# Patient Record
Sex: MALE | Race: BLACK OR AFRICAN AMERICAN | URBAN - METROPOLITAN AREA
[De-identification: names, ages, dates, MRNs, and addresses within clinical notes are randomized per-mention and may not be internally consistent; named-entity substitution may affect disease eponyms.]

---

## 2020-11-17 ENCOUNTER — OFFICE VISIT (OUTPATIENT)
Dept: URBAN - METROPOLITAN AREA CLINIC 27 | Facility: CLINIC | Age: 80
End: 2020-11-17
Payer: MEDICARE

## 2020-11-17 DIAGNOSIS — E11.9 TYPE 2 DIABETES MELLITUS WITHOUT COMPLICATIONS: ICD-10-CM

## 2020-11-17 PROCEDURE — 92134 CPTRZ OPH DX IMG PST SGM RTA: CPT | Performed by: OPHTHALMOLOGY

## 2020-11-17 PROCEDURE — 67028 INJECTION EYE DRUG: CPT | Performed by: OPHTHALMOLOGY

## 2020-11-17 ASSESSMENT — INTRAOCULAR PRESSURE
OD: 14
OS: 12

## 2020-11-17 NOTE — IMPRESSION/PLAN
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
s/p Avastin, last OD 9/17/2020 Plan: Exam and OCT continue to show extrafoveal SRF, stable s/p Avastin 9 weeks ago. I have recommended continuing treatment q10 weeks. The diagnosis, natural history, and prognosis of CNV, as well as the R/B/A of laser, PDT, and anti-VEGF were discussed. The patient understands that treatment may not improve vision but should reduce the risk of further visual loss. The patient elects to proceed with intravitreal Avastin OD, which was performed in the office per protocol without complication. 

RTC 10 weeks OCT OU re-eval Avastin, DFE OU (semiannual)

## 2020-11-17 NOTE — IMPRESSION/PLAN
Impression: Type 2 diabetes mellitus without complications: I92.8. OU. Plan: Fortunately exam continues to show no evidence of retinopathy. The patient was advised to maintain tight BS, BP, and lipid control. The patient was also advised to schedule an appointment with a PCP for a diabetic evaluation and counseling to avoid the systemic complications of diabetes.  Last A1C- 6.5

## 2021-01-26 ENCOUNTER — OFFICE VISIT (OUTPATIENT)
Dept: URBAN - METROPOLITAN AREA CLINIC 27 | Facility: CLINIC | Age: 81
End: 2021-01-26
Payer: MEDICARE

## 2021-01-26 PROCEDURE — 92134 CPTRZ OPH DX IMG PST SGM RTA: CPT | Performed by: OPHTHALMOLOGY

## 2021-01-26 PROCEDURE — 92014 COMPRE OPH EXAM EST PT 1/>: CPT | Performed by: OPHTHALMOLOGY

## 2021-01-26 PROCEDURE — 67028 INJECTION EYE DRUG: CPT | Performed by: OPHTHALMOLOGY

## 2021-01-26 ASSESSMENT — INTRAOCULAR PRESSURE
OD: 12
OS: 13

## 2021-01-26 NOTE — IMPRESSION/PLAN
Impression: Type 2 diabetes mellitus without complications: O73.0. OU. Plan: Fortunately exam continues to show no evidence of retinopathy. The patient was advised to maintain tight BS, BP, and lipid control. The patient was also advised to schedule an appointment with a PCP for a diabetic evaluation and counseling to avoid the systemic complications of diabetes.  Last A1C- 6.5

## 2021-01-26 NOTE — IMPRESSION/PLAN
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
s/p Avastin, last OD 11/17/2020 Plan: Both eyes are due for full dilated semiannual exam today. Exam and OCT continue to show extrafoveal SRF, improving s/p Avastin 10 weeks ago. I have recommended extending treatment interval q12 weeks. The diagnosis, natural history, and prognosis of CNV, as well as the R/B/A of laser, PDT, and anti-VEGF were discussed. The patient understands that treatment may not improve vision but should reduce the risk of further visual loss. The patient elects to proceed with intravitreal Avastin OD, which was performed in the office per protocol without complication. 

RTC 12 weeks OCT OU re-eval Avastin

## 2021-04-20 ENCOUNTER — OFFICE VISIT (OUTPATIENT)
Dept: URBAN - METROPOLITAN AREA CLINIC 27 | Facility: CLINIC | Age: 81
End: 2021-04-20
Payer: MEDICARE

## 2021-04-20 PROCEDURE — 99213 OFFICE O/P EST LOW 20 MIN: CPT | Performed by: OPHTHALMOLOGY

## 2021-04-20 PROCEDURE — 67028 INJECTION EYE DRUG: CPT | Performed by: OPHTHALMOLOGY

## 2021-04-20 PROCEDURE — 92134 CPTRZ OPH DX IMG PST SGM RTA: CPT | Performed by: OPHTHALMOLOGY

## 2021-04-20 ASSESSMENT — INTRAOCULAR PRESSURE
OS: 9
OD: 10

## 2021-04-20 NOTE — IMPRESSION/PLAN
Impression: Type 2 diabetes mellitus without complications: O77.3. OU. Plan: Fortunately exam continues to show no evidence of retinopathy. The patient was advised to maintain tight BS, BP, and lipid control. The patient was also advised to schedule an appointment with a PCP for a diabetic evaluation and counseling to avoid the systemic complications of diabetes.  Last A1C- 6.5

## 2021-04-20 NOTE — IMPRESSION/PLAN
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
s/p Avastin, last OD 1/26/21
last DFE OU 1/26/21 Plan: Exam and OCT demonstrate worsening extrafoveal SRF s/p Avastin 12 weeks ago. I have recommended increasing treatment frequency back to q10 weeks. The diagnosis, natural history, and prognosis of CNV, as well as the R/B/A of laser, PDT, and anti-VEGF were discussed. The patient understands that treatment may not improve vision but should reduce the risk of further visual loss. The patient elects to proceed with intravitreal Avastin OD, which was performed in the office per protocol without complication. 

RTC 10 weeks OCT OU re-eval Avastin

## 2021-06-29 ENCOUNTER — OFFICE VISIT (OUTPATIENT)
Dept: URBAN - METROPOLITAN AREA CLINIC 27 | Facility: CLINIC | Age: 81
End: 2021-06-29
Payer: MEDICARE

## 2021-06-29 DIAGNOSIS — H25.13 AGE-RELATED NUCLEAR CATARACT, BILATERAL: ICD-10-CM

## 2021-06-29 PROCEDURE — 92134 CPTRZ OPH DX IMG PST SGM RTA: CPT | Performed by: OPHTHALMOLOGY

## 2021-06-29 PROCEDURE — 67028 INJECTION EYE DRUG: CPT | Performed by: OPHTHALMOLOGY

## 2021-06-29 PROCEDURE — 99213 OFFICE O/P EST LOW 20 MIN: CPT | Performed by: OPHTHALMOLOGY

## 2021-06-29 ASSESSMENT — INTRAOCULAR PRESSURE
OS: 8
OD: 7

## 2021-06-29 NOTE — IMPRESSION/PLAN
Impression: Type 2 diabetes mellitus without complications: W57.6. OU. Plan: Fortunately exam continues to show no evidence of retinopathy. The patient was advised to maintain tight BS, BP, and lipid control. The patient was also advised to schedule an appointment with a PCP for a diabetic evaluation and counseling to avoid the systemic complications of diabetes.  Last A1C- 6.5

## 2021-06-29 NOTE — IMPRESSION/PLAN
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
s/p Avastin, last OD 4/20/21
last DFE OU 1/26/21 Plan: Exam and OCT reveal improving extrafoveal SRF s/p Avastin 10 weeks ago. Recent h/o worsening at 12 weeks. I have recommended continuing injections q10 weeks. The diagnosis, natural history, and prognosis of CNV, as well as the R/B/A of laser, PDT, and anti-VEGF were discussed. The patient understands that treatment may not improve vision but should reduce the risk of further visual loss. The patient elects to proceed with intravitreal Avastin OD, which was performed in the office per protocol without complication. 

RTC 10 weeks OCT OU re-eval Avastin

## 2021-09-07 ENCOUNTER — OFFICE VISIT (OUTPATIENT)
Dept: URBAN - METROPOLITAN AREA CLINIC 27 | Facility: CLINIC | Age: 81
End: 2021-09-07
Payer: MEDICARE

## 2021-09-07 DIAGNOSIS — H35.3211 EXUDATIVE AGE-RELATED MACULAR DEGENERATION, RIGHT EYE, WITH ACTIVE CHOROIDAL NEOVASCULARIZATION: ICD-10-CM

## 2021-09-07 DIAGNOSIS — H35.051 RETINAL NEOVASCULARIZATION, UNSPECIFIED, RIGHT EYE: Primary | ICD-10-CM

## 2021-09-07 PROCEDURE — 92014 COMPRE OPH EXAM EST PT 1/>: CPT | Performed by: OPHTHALMOLOGY

## 2021-09-07 PROCEDURE — 67028 INJECTION EYE DRUG: CPT | Performed by: OPHTHALMOLOGY

## 2021-09-07 PROCEDURE — 92134 CPTRZ OPH DX IMG PST SGM RTA: CPT | Performed by: OPHTHALMOLOGY

## 2021-09-07 ASSESSMENT — INTRAOCULAR PRESSURE
OD: 16
OS: 12

## 2021-09-07 NOTE — IMPRESSION/PLAN
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
s/p Avastin, last OD 6/29/21 Plan: Both eyes are due for full semiannual DFE today. Exam and OCT reveal improving extrafoveal SRF s/p Avastin 10 weeks ago. Recent h/o worsening at 12 weeks. I have recommended continuing injections q10 weeks. The diagnosis, natural history, and prognosis of CNV, as well as the R/B/A of laser, PDT, and anti-VEGF were discussed. The patient understands that treatment may not improve vision but should reduce the risk of further visual loss. The patient elects to proceed with intravitreal Avastin OD, which was performed in the office per protocol without complication. 

RTC 10 weeks OCT OU re-eval Avastin

## 2021-09-07 NOTE — IMPRESSION/PLAN
Impression: Type 2 diabetes mellitus without complications: Y14.5. OU. Plan: Fortunately exam continues to show no evidence of retinopathy. The patient was advised to maintain tight BS, BP, and lipid control. The patient was also advised to schedule an appointment with a PCP for a diabetic evaluation and counseling to avoid the systemic complications of diabetes.  Last A1C- 6.5

## 2021-11-16 ENCOUNTER — OFFICE VISIT (OUTPATIENT)
Dept: URBAN - METROPOLITAN AREA CLINIC 27 | Facility: CLINIC | Age: 81
End: 2021-11-16
Payer: MEDICARE

## 2021-11-16 PROCEDURE — 67028 INJECTION EYE DRUG: CPT | Performed by: OPHTHALMOLOGY

## 2021-11-16 PROCEDURE — 92134 CPTRZ OPH DX IMG PST SGM RTA: CPT | Performed by: OPHTHALMOLOGY

## 2021-11-16 ASSESSMENT — INTRAOCULAR PRESSURE
OS: 9
OD: 9

## 2021-11-16 NOTE — IMPRESSION/PLAN
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
s/p Avastin, last OD 09/07/21
last DFE OU 09/07/21 Plan: Exam and OCT reveal stable extrafoveal SRF s/p Avastin 10 weeks ago. H/o worsening at 12 weeks. I have recommended continuing Avastin OD, will try extension to 12 weeks again. The diagnosis, natural history, and prognosis of CNV, as well as the R/B/A of laser, PDT, and anti-VEGF were discussed. The patient understands that treatment may not improve vision but should reduce the risk of further visual loss. The patient elects to proceed with intravitreal Avastin OD, which was performed in the office per protocol without complication. 

RTC 12 weeks OCT OU re-eval Avastin

## 2021-11-16 NOTE — IMPRESSION/PLAN
Impression: Type 2 diabetes mellitus without complications: O10.5. OU. Plan: Fortunately exam continues to show no evidence of retinopathy. The patient was advised to maintain tight BS, BP, and lipid control. The patient was also advised to schedule an appointment with a PCP for a diabetic evaluation and counseling to avoid the systemic complications of diabetes.  Last A1C- 6.5

## 2022-02-08 ENCOUNTER — OFFICE VISIT (OUTPATIENT)
Dept: URBAN - METROPOLITAN AREA CLINIC 27 | Facility: CLINIC | Age: 82
End: 2022-02-08
Payer: MEDICARE

## 2022-02-08 PROCEDURE — 92134 CPTRZ OPH DX IMG PST SGM RTA: CPT | Performed by: OPHTHALMOLOGY

## 2022-02-08 PROCEDURE — 92014 COMPRE OPH EXAM EST PT 1/>: CPT | Performed by: OPHTHALMOLOGY

## 2022-02-08 PROCEDURE — 67028 INJECTION EYE DRUG: CPT | Performed by: OPHTHALMOLOGY

## 2022-02-08 ASSESSMENT — INTRAOCULAR PRESSURE
OD: 14
OS: 13

## 2022-02-08 NOTE — IMPRESSION/PLAN
Impression: Type 2 diabetes mellitus without complications: Q88.6. OU. Plan: Fortunately exam continues to show no evidence of retinopathy. The patient was advised to maintain tight BS, BP, and lipid control. The patient was also advised to schedule an appointment with a PCP for a diabetic evaluation and counseling to avoid the systemic complications of diabetes.  Last A1C- 6.2

## 2022-02-08 NOTE — IMPRESSION/PLAN
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
s/p Avastin, last OD 11/16/21 Plan: Dilated exam and OCT reveal persistent extrafoveal SRF s/p Avastin 12 weeks ago. I have recommended continuing Avastin OD, maintain q12 week treatment interval. The diagnosis, natural history, and prognosis of CNV, as well as the R/B/A of laser, PDT, and anti-VEGF were discussed. The patient understands that treatment may not improve vision but should reduce the risk of further visual loss. The patient elects to proceed with intravitreal Avastin OD, which was performed in the office per protocol without complication. 

RTC 12 weeks OCT OU re-eval Avastin

## 2022-05-05 ENCOUNTER — OFFICE VISIT (OUTPATIENT)
Dept: URBAN - METROPOLITAN AREA CLINIC 27 | Facility: CLINIC | Age: 82
End: 2022-05-05
Payer: MEDICARE

## 2022-05-05 DIAGNOSIS — H35.81 RETINAL EDEMA: ICD-10-CM

## 2022-05-05 DIAGNOSIS — H25.13 AGE-RELATED NUCLEAR CATARACT, BILATERAL: ICD-10-CM

## 2022-05-05 DIAGNOSIS — H35.3211 EXUDATIVE AGE-RELATED MACULAR DEGENERATION, RIGHT EYE, WITH ACTIVE CHOROIDAL NEOVASCULARIZATION: Primary | ICD-10-CM

## 2022-05-05 DIAGNOSIS — E11.9 TYPE 2 DIABETES MELLITUS WITHOUT COMPLICATIONS: ICD-10-CM

## 2022-05-05 PROCEDURE — 99213 OFFICE O/P EST LOW 20 MIN: CPT | Performed by: OPHTHALMOLOGY

## 2022-05-05 PROCEDURE — 92134 CPTRZ OPH DX IMG PST SGM RTA: CPT | Performed by: OPHTHALMOLOGY

## 2022-05-05 PROCEDURE — 67028 INJECTION EYE DRUG: CPT | Performed by: OPHTHALMOLOGY

## 2022-05-05 ASSESSMENT — INTRAOCULAR PRESSURE
OS: 16
OD: 13

## 2022-05-05 NOTE — IMPRESSION/PLAN
Impression: Type 2 diabetes mellitus without complications: X77.0. OU. Plan: Fortunately exam continues to show no evidence of retinopathy. The patient was advised to maintain tight BS, BP, and lipid control. The patient was also advised to schedule an appointment with a PCP for a diabetic evaluation and counseling to avoid the systemic complications of diabetes.  Last A1C- 6.2

## 2022-05-05 NOTE — IMPRESSION/PLAN
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
s/p Avastin, last OD 2/8/22 Plan: Exam and OCT reveal stable extrafoveal SRF s/p Avastin 12 weeks ago. I have recommended continuing Avastin OD, maintain q12 week treatment interval. The diagnosis, natural history, and prognosis of CNV, as well as the R/B/A of laser, PDT, and anti-VEGF were discussed. The patient understands that treatment may not improve vision but should reduce the risk of further visual loss. The patient elects to proceed with intravitreal Avastin OD, which was performed in the office per protocol without complication. 

RTC 12 weeks OCT OU re-eval Avastin

## 2022-08-11 ENCOUNTER — OFFICE VISIT (OUTPATIENT)
Dept: URBAN - METROPOLITAN AREA CLINIC 27 | Facility: CLINIC | Age: 82
End: 2022-08-11
Payer: MEDICARE

## 2022-08-11 DIAGNOSIS — H35.3211 EXUDATIVE AGE-RELATED MACULAR DEGENERATION, RIGHT EYE, WITH ACTIVE CHOROIDAL NEOVASCULARIZATION: Primary | ICD-10-CM

## 2022-08-11 DIAGNOSIS — H25.13 AGE-RELATED NUCLEAR CATARACT, BILATERAL: ICD-10-CM

## 2022-08-11 DIAGNOSIS — H35.81 RETINAL EDEMA: ICD-10-CM

## 2022-08-11 DIAGNOSIS — E11.9 TYPE 2 DIABETES MELLITUS WITHOUT COMPLICATIONS: ICD-10-CM

## 2022-08-11 PROCEDURE — 92134 CPTRZ OPH DX IMG PST SGM RTA: CPT | Performed by: OPHTHALMOLOGY

## 2022-08-11 PROCEDURE — 99213 OFFICE O/P EST LOW 20 MIN: CPT | Performed by: OPHTHALMOLOGY

## 2022-08-11 PROCEDURE — 67028 INJECTION EYE DRUG: CPT | Performed by: OPHTHALMOLOGY

## 2022-08-11 ASSESSMENT — INTRAOCULAR PRESSURE
OS: 9
OD: 8

## 2022-08-11 NOTE — IMPRESSION/PLAN
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
s/p Avastin, last OD 05/05/22 Plan: Exam and OCT continue to demonstrate extrafoveal SRF s/p Avastin 14 weeks ago. Stable appearance. I have recommended continuing Avastin OD, maintain q12 week treatment interval. The diagnosis, natural history, and prognosis of CNV, as well as the R/B/A of laser, PDT, and anti-VEGF were discussed. The patient understands that treatment may not improve vision but should reduce the risk of further visual loss. The patient elects to proceed with intravitreal Avastin OD, which was performed in the office per protocol without complication. 

RTC 12 weeks OCT OU re-eval Avastin

## 2022-08-11 NOTE — IMPRESSION/PLAN
Impression: Type 2 diabetes mellitus without complications: G52.4. OU. Plan: Fortunately exam continues to show no evidence of retinopathy. The patient was advised to maintain tight BS, BP, and lipid control. The patient was also advised to schedule an appointment with a PCP for a diabetic evaluation and counseling to avoid the systemic complications of diabetes.  Last A1C- 6.5

## 2022-11-03 ENCOUNTER — OFFICE VISIT (OUTPATIENT)
Dept: URBAN - METROPOLITAN AREA CLINIC 27 | Facility: CLINIC | Age: 82
End: 2022-11-03
Payer: MEDICARE

## 2022-11-03 DIAGNOSIS — H35.81 RETINAL EDEMA: ICD-10-CM

## 2022-11-03 DIAGNOSIS — E11.9 TYPE 2 DIABETES MELLITUS WITHOUT COMPLICATIONS: ICD-10-CM

## 2022-11-03 DIAGNOSIS — H25.13 AGE-RELATED NUCLEAR CATARACT, BILATERAL: ICD-10-CM

## 2022-11-03 DIAGNOSIS — H35.3211 EXUDATIVE AGE-RELATED MACULAR DEGENERATION, RIGHT EYE, WITH ACTIVE CHOROIDAL NEOVASCULARIZATION: Primary | ICD-10-CM

## 2022-11-03 PROCEDURE — 67028 INJECTION EYE DRUG: CPT | Performed by: OPHTHALMOLOGY

## 2022-11-03 PROCEDURE — 92134 CPTRZ OPH DX IMG PST SGM RTA: CPT | Performed by: OPHTHALMOLOGY

## 2022-11-03 PROCEDURE — 99213 OFFICE O/P EST LOW 20 MIN: CPT | Performed by: OPHTHALMOLOGY

## 2022-11-03 ASSESSMENT — INTRAOCULAR PRESSURE
OD: 15
OS: 10

## 2022-11-03 NOTE — IMPRESSION/PLAN
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
s/p Avastin, last OD 08/11/22 Plan: Exam and OCT show stable extrafoveal SRF s/p Avastin 12 weeks ago. I have recommended continuing Avastin OD, maintain q12 week treatment interval. The diagnosis, natural history, and prognosis of CNV, as well as the R/B/A of laser, PDT, and anti-VEGF were discussed. The patient understands that treatment may not improve vision but should reduce the risk of further visual loss. The patient elects to proceed with intravitreal Avastin OD, which was performed in the office per protocol without complication. 

RTC 12 weeks OCT OU re-eval Avastin

## 2022-11-03 NOTE — IMPRESSION/PLAN
Impression: Type 2 diabetes mellitus without complications: P00.7. OU. Plan: Fortunately exam continues to show no evidence of retinopathy. The patient was advised to maintain tight BS, BP, and lipid control. The patient was also advised to schedule an appointment with a PCP for a diabetic evaluation and counseling to avoid the systemic complications of diabetes.  Last A1C- 6.5

## 2023-01-26 ENCOUNTER — OFFICE VISIT (OUTPATIENT)
Dept: URBAN - METROPOLITAN AREA CLINIC 27 | Facility: CLINIC | Age: 83
End: 2023-01-26
Payer: MEDICARE

## 2023-01-26 DIAGNOSIS — H25.13 AGE-RELATED NUCLEAR CATARACT, BILATERAL: ICD-10-CM

## 2023-01-26 DIAGNOSIS — H35.3211 EXUDATIVE AGE-RELATED MACULAR DEGENERATION, RIGHT EYE, WITH ACTIVE CHOROIDAL NEOVASCULARIZATION: Primary | ICD-10-CM

## 2023-01-26 DIAGNOSIS — H35.81 RETINAL EDEMA: ICD-10-CM

## 2023-01-26 DIAGNOSIS — E11.9 TYPE 2 DIABETES MELLITUS WITHOUT COMPLICATIONS: ICD-10-CM

## 2023-01-26 PROCEDURE — 92134 CPTRZ OPH DX IMG PST SGM RTA: CPT | Performed by: OPHTHALMOLOGY

## 2023-01-26 PROCEDURE — 99213 OFFICE O/P EST LOW 20 MIN: CPT | Performed by: OPHTHALMOLOGY

## 2023-01-26 PROCEDURE — 67028 INJECTION EYE DRUG: CPT | Performed by: OPHTHALMOLOGY

## 2023-01-26 ASSESSMENT — INTRAOCULAR PRESSURE
OS: 16
OD: 9

## 2023-01-26 NOTE — IMPRESSION/PLAN
Impression: Type 2 diabetes mellitus without complications: E53.7. OU. Plan: Still no evidence of retinopathy. The patient was advised to maintain tight BS, BP, and lipid control. The patient was also advised to schedule an appointment with a PCP for a diabetic evaluation and counseling to avoid the systemic complications of diabetes.  Last A1C- 6.5

## 2023-01-26 NOTE — IMPRESSION/PLAN
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
s/p Avastin, last OD 11/03/22 Plan: Exam and OCT reveal extrafoveal SRF, slightly worse s/p Avastin 12 weeks ago. I have recommended continuing Avastin OD, maintain q12 week treatment interval. The diagnosis, natural history, and prognosis of CNV, as well as the R/B/A of laser, PDT, and anti-VEGF were discussed. The patient understands that treatment may not improve vision but should reduce the risk of further visual loss. The patient elects to proceed with intravitreal Avastin OD, which was performed in the office per protocol without complication. 

RTC 12 weeks OCT OU Avastin OD re-eval

## 2023-04-20 ENCOUNTER — OFFICE VISIT (OUTPATIENT)
Dept: URBAN - METROPOLITAN AREA CLINIC 27 | Facility: CLINIC | Age: 83
End: 2023-04-20
Payer: MEDICARE

## 2023-04-20 DIAGNOSIS — E11.9 TYPE 2 DIABETES MELLITUS WITHOUT COMPLICATIONS: ICD-10-CM

## 2023-04-20 DIAGNOSIS — H35.81 RETINAL EDEMA: ICD-10-CM

## 2023-04-20 DIAGNOSIS — H25.13 AGE-RELATED NUCLEAR CATARACT, BILATERAL: ICD-10-CM

## 2023-04-20 DIAGNOSIS — H35.3211 EXUDATIVE AGE-RELATED MACULAR DEGENERATION, RIGHT EYE, WITH ACTIVE CHOROIDAL NEOVASCULARIZATION: Primary | ICD-10-CM

## 2023-04-20 PROCEDURE — 92134 CPTRZ OPH DX IMG PST SGM RTA: CPT | Performed by: OPHTHALMOLOGY

## 2023-04-20 PROCEDURE — 67028 INJECTION EYE DRUG: CPT | Performed by: OPHTHALMOLOGY

## 2023-04-20 PROCEDURE — 99213 OFFICE O/P EST LOW 20 MIN: CPT | Performed by: OPHTHALMOLOGY

## 2023-04-20 ASSESSMENT — INTRAOCULAR PRESSURE
OS: 14
OD: 14

## 2023-04-20 NOTE — IMPRESSION/PLAN
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
s/p Avastin, last OD 01/26/23 Plan: Exam and OCT demonstrate stable peripapillary SRF, stable s/p Avastin 12 weeks ago. I have recommended continuing Avastin OD, maintain q12 week treatment interval. The diagnosis, natural history, and prognosis of CNV, as well as the R/B/A of laser, PDT, and anti-VEGF were discussed. The patient understands that treatment may not improve vision but should reduce the risk of further visual loss. The patient elects to proceed with intravitreal Avastin OD, which was performed in the office per protocol without complication. 

RTC 12 weeks OCT OU Avastin OD re-eval

## 2023-04-20 NOTE — IMPRESSION/PLAN
Impression: Type 2 diabetes mellitus without complications: R04.0. OU. Plan: Still no evidence of retinopathy. The patient was advised to maintain tight BS, BP, and lipid control. The patient was also advised to schedule an appointment with a PCP for a diabetic evaluation and counseling to avoid the systemic complications of diabetes.  Last A1C- 6.5

## 2023-07-13 ENCOUNTER — OFFICE VISIT (OUTPATIENT)
Dept: URBAN - METROPOLITAN AREA CLINIC 27 | Facility: CLINIC | Age: 83
End: 2023-07-13
Payer: MEDICARE

## 2023-07-13 DIAGNOSIS — E11.9 TYPE 2 DIABETES MELLITUS WITHOUT COMPLICATIONS: ICD-10-CM

## 2023-07-13 DIAGNOSIS — H35.3211 EXUDATIVE AGE-RELATED MACULAR DEGENERATION, RIGHT EYE, WITH ACTIVE CHOROIDAL NEOVASCULARIZATION: Primary | ICD-10-CM

## 2023-07-13 DIAGNOSIS — H25.13 AGE-RELATED NUCLEAR CATARACT, BILATERAL: ICD-10-CM

## 2023-07-13 DIAGNOSIS — H35.81 RETINAL EDEMA: ICD-10-CM

## 2023-07-13 PROCEDURE — 67028 INJECTION EYE DRUG: CPT | Performed by: OPHTHALMOLOGY

## 2023-07-13 PROCEDURE — 99213 OFFICE O/P EST LOW 20 MIN: CPT | Performed by: OPHTHALMOLOGY

## 2023-07-13 PROCEDURE — 92134 CPTRZ OPH DX IMG PST SGM RTA: CPT | Performed by: OPHTHALMOLOGY

## 2023-07-13 ASSESSMENT — INTRAOCULAR PRESSURE
OS: 16
OD: 11

## 2023-07-13 NOTE — IMPRESSION/PLAN
Impression: Type 2 diabetes mellitus without complications: U99.7. OU. Plan: Still no evidence of retinopathy. The patient was advised to maintain tight BS, BP, and lipid control. The patient was also advised to schedule an appointment with a PCP for a diabetic evaluation and counseling to avoid the systemic complications of diabetes.  Last A1C- 6.5

## 2023-07-13 NOTE — IMPRESSION/PLAN
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
s/p Avastin, last OD 04/20/23 Plan: Exam and OCT reveal peripapillary SRF, remains stable s/p Avastin 12 weeks ago. I have recommended continuing Avastin OD, maintain q12 week treatment interval. The diagnosis, natural history, and prognosis of CNV, as well as the R/B/A of laser, PDT, and anti-VEGF were discussed. The patient understands that treatment may not improve vision but should reduce the risk of further visual loss. The patient elects to proceed with intravitreal Avastin OD, which was performed in the office per protocol without complication. 

RTC 12 weeks OCT OU Avastin OD re-eval

## 2023-10-05 ENCOUNTER — OFFICE VISIT (OUTPATIENT)
Dept: URBAN - METROPOLITAN AREA CLINIC 27 | Facility: CLINIC | Age: 83
End: 2023-10-05
Payer: MEDICARE

## 2023-10-05 DIAGNOSIS — H25.13 AGE-RELATED NUCLEAR CATARACT, BILATERAL: ICD-10-CM

## 2023-10-05 DIAGNOSIS — H35.81 RETINAL EDEMA: ICD-10-CM

## 2023-10-05 DIAGNOSIS — H35.3211 EXUDATIVE AGE-RELATED MACULAR DEGENERATION, RIGHT EYE, WITH ACTIVE CHOROIDAL NEOVASCULARIZATION: Primary | ICD-10-CM

## 2023-10-05 DIAGNOSIS — E11.9 TYPE 2 DIABETES MELLITUS WITHOUT COMPLICATIONS: ICD-10-CM

## 2023-10-05 PROCEDURE — 99213 OFFICE O/P EST LOW 20 MIN: CPT | Performed by: OPHTHALMOLOGY

## 2023-10-05 PROCEDURE — 67028 INJECTION EYE DRUG: CPT | Performed by: OPHTHALMOLOGY

## 2023-10-05 PROCEDURE — 92134 CPTRZ OPH DX IMG PST SGM RTA: CPT | Performed by: OPHTHALMOLOGY

## 2023-10-05 ASSESSMENT — INTRAOCULAR PRESSURE
OS: 9
OD: 10

## 2024-01-11 ENCOUNTER — OFFICE VISIT (OUTPATIENT)
Dept: URBAN - METROPOLITAN AREA CLINIC 27 | Facility: CLINIC | Age: 84
End: 2024-01-11
Payer: MEDICARE

## 2024-01-11 DIAGNOSIS — E11.9 TYPE 2 DIABETES MELLITUS WITHOUT COMPLICATIONS: ICD-10-CM

## 2024-01-11 DIAGNOSIS — H35.81 RETINAL EDEMA: ICD-10-CM

## 2024-01-11 DIAGNOSIS — H25.13 AGE-RELATED NUCLEAR CATARACT, BILATERAL: ICD-10-CM

## 2024-01-11 DIAGNOSIS — H35.3211 EXUDATIVE AGE-RELATED MACULAR DEGENERATION, RIGHT EYE, WITH ACTIVE CHOROIDAL NEOVASCULARIZATION: Primary | ICD-10-CM

## 2024-01-11 PROCEDURE — 67028 INJECTION EYE DRUG: CPT | Performed by: OPHTHALMOLOGY

## 2024-01-11 PROCEDURE — 99213 OFFICE O/P EST LOW 20 MIN: CPT | Performed by: OPHTHALMOLOGY

## 2024-01-11 PROCEDURE — 92134 CPTRZ OPH DX IMG PST SGM RTA: CPT | Performed by: OPHTHALMOLOGY

## 2024-01-11 ASSESSMENT — INTRAOCULAR PRESSURE
OS: 16
OD: 14

## 2024-04-18 ENCOUNTER — OFFICE VISIT (OUTPATIENT)
Dept: URBAN - METROPOLITAN AREA CLINIC 27 | Facility: CLINIC | Age: 84
End: 2024-04-18
Payer: MEDICARE

## 2024-04-18 DIAGNOSIS — H35.3211 EXUDATIVE AGE-RELATED MACULAR DEGENERATION, RIGHT EYE, WITH ACTIVE CHOROIDAL NEOVASCULARIZATION: Primary | ICD-10-CM

## 2024-04-18 DIAGNOSIS — H35.81 RETINAL EDEMA: ICD-10-CM

## 2024-04-18 DIAGNOSIS — E11.9 TYPE 2 DIABETES MELLITUS WITHOUT COMPLICATIONS: ICD-10-CM

## 2024-04-18 DIAGNOSIS — H25.13 AGE-RELATED NUCLEAR CATARACT, BILATERAL: ICD-10-CM

## 2024-04-18 PROCEDURE — 67028 INJECTION EYE DRUG: CPT | Performed by: OPHTHALMOLOGY

## 2024-04-18 PROCEDURE — 92134 CPTRZ OPH DX IMG PST SGM RTA: CPT | Performed by: OPHTHALMOLOGY

## 2024-04-18 PROCEDURE — 99213 OFFICE O/P EST LOW 20 MIN: CPT | Performed by: OPHTHALMOLOGY

## 2024-04-18 ASSESSMENT — INTRAOCULAR PRESSURE
OS: 14
OD: 14

## 2024-08-08 ENCOUNTER — OFFICE VISIT (OUTPATIENT)
Dept: URBAN - METROPOLITAN AREA CLINIC 27 | Facility: CLINIC | Age: 84
End: 2024-08-08
Payer: MEDICARE

## 2024-08-08 DIAGNOSIS — H25.13 AGE-RELATED NUCLEAR CATARACT, BILATERAL: ICD-10-CM

## 2024-08-08 DIAGNOSIS — H35.81 RETINAL EDEMA: ICD-10-CM

## 2024-08-08 DIAGNOSIS — E11.9 TYPE 2 DIABETES MELLITUS WITHOUT COMPLICATIONS: ICD-10-CM

## 2024-08-08 DIAGNOSIS — H35.3211 EXUDATIVE AGE-RELATED MACULAR DEGENERATION, RIGHT EYE, WITH ACTIVE CHOROIDAL NEOVASCULARIZATION: Primary | ICD-10-CM

## 2024-08-08 PROCEDURE — 92134 CPTRZ OPH DX IMG PST SGM RTA: CPT | Performed by: OPHTHALMOLOGY

## 2024-08-08 PROCEDURE — 99213 OFFICE O/P EST LOW 20 MIN: CPT | Performed by: OPHTHALMOLOGY

## 2024-08-08 ASSESSMENT — INTRAOCULAR PRESSURE
OS: 12
OD: 13

## 2024-12-19 ENCOUNTER — OFFICE VISIT (OUTPATIENT)
Dept: URBAN - METROPOLITAN AREA CLINIC 41 | Facility: CLINIC | Age: 84
End: 2024-12-19
Payer: MEDICARE

## 2024-12-19 DIAGNOSIS — H25.13 AGE-RELATED NUCLEAR CATARACT, BILATERAL: ICD-10-CM

## 2024-12-19 DIAGNOSIS — E11.9 TYPE 2 DIABETES MELLITUS WITHOUT COMPLICATIONS: ICD-10-CM

## 2024-12-19 DIAGNOSIS — H35.3211 EXUDATIVE AGE-RELATED MACULAR DEGENERATION, RIGHT EYE, WITH ACTIVE CHOROIDAL NEOVASCULARIZATION: Primary | ICD-10-CM

## 2024-12-19 DIAGNOSIS — H35.81 RETINAL EDEMA: ICD-10-CM

## 2024-12-19 PROCEDURE — 99214 OFFICE O/P EST MOD 30 MIN: CPT | Performed by: OPHTHALMOLOGY

## 2024-12-19 PROCEDURE — 92134 CPTRZ OPH DX IMG PST SGM RTA: CPT | Performed by: OPHTHALMOLOGY

## 2024-12-19 PROCEDURE — 67028 INJECTION EYE DRUG: CPT | Performed by: OPHTHALMOLOGY

## 2024-12-19 ASSESSMENT — INTRAOCULAR PRESSURE
OS: 9
OD: 8

## 2025-04-16 ENCOUNTER — OFFICE VISIT (OUTPATIENT)
Dept: URBAN - METROPOLITAN AREA CLINIC 7 | Facility: CLINIC | Age: 85
End: 2025-04-16
Payer: MEDICARE

## 2025-04-16 DIAGNOSIS — H35.81 RETINAL EDEMA: ICD-10-CM

## 2025-04-16 DIAGNOSIS — H25.12 AGE-RELATED NUCLEAR CATARACT, LEFT EYE: ICD-10-CM

## 2025-04-16 DIAGNOSIS — H35.3211 EXUDATIVE AGE-RELATED MACULAR DEGENERATION, RIGHT EYE, WITH ACTIVE CHOROIDAL NEOVASCULARIZATION: ICD-10-CM

## 2025-04-16 DIAGNOSIS — E11.9 TYPE 2 DIABETES MELLITUS WITHOUT COMPLICATIONS: ICD-10-CM

## 2025-04-16 DIAGNOSIS — Z96.1 PRESENCE OF INTRAOCULAR LENS: ICD-10-CM

## 2025-04-16 PROCEDURE — 92134 CPTRZ OPH DX IMG PST SGM RTA: CPT | Performed by: STUDENT IN AN ORGANIZED HEALTH CARE EDUCATION/TRAINING PROGRAM

## 2025-04-16 PROCEDURE — 99214 OFFICE O/P EST MOD 30 MIN: CPT | Performed by: STUDENT IN AN ORGANIZED HEALTH CARE EDUCATION/TRAINING PROGRAM

## 2025-04-16 RX ORDER — OFLOXACIN 3 MG/ML
0.3 % SOLUTION/ DROPS OPHTHALMIC
Qty: 5 | Refills: 3 | Status: ACTIVE
Start: 2025-04-16

## 2025-04-16 RX ORDER — PREDNISOLONE ACETATE 10 MG/ML
1 % SUSPENSION/ DROPS OPHTHALMIC
Qty: 5 | Refills: 3 | Status: ACTIVE
Start: 2025-04-16

## 2025-04-16 ASSESSMENT — INTRAOCULAR PRESSURE
OD: 21
OS: 18

## 2025-04-19 ENCOUNTER — POST-OPERATIVE VISIT (OUTPATIENT)
Dept: URBAN - METROPOLITAN AREA CLINIC 7 | Facility: CLINIC | Age: 85
End: 2025-04-19
Payer: MEDICARE

## 2025-04-19 DIAGNOSIS — H59.029 LENS FRAGMENT IN EYE FOLLOWING CATARACT SURGERY: Primary | ICD-10-CM

## 2025-04-19 PROCEDURE — 99024 POSTOP FOLLOW-UP VISIT: CPT | Performed by: OPHTHALMOLOGY

## 2025-04-19 ASSESSMENT — INTRAOCULAR PRESSURE
OD: 14
OS: 14

## 2025-04-24 ENCOUNTER — POST-OPERATIVE VISIT (OUTPATIENT)
Dept: URBAN - METROPOLITAN AREA CLINIC 7 | Facility: CLINIC | Age: 85
End: 2025-04-24
Payer: MEDICARE

## 2025-04-24 DIAGNOSIS — H59.029 LENS FRAGMENT IN EYE FOLLOWING CATARACT SURGERY: Primary | ICD-10-CM

## 2025-04-24 PROCEDURE — 99024 POSTOP FOLLOW-UP VISIT: CPT | Performed by: STUDENT IN AN ORGANIZED HEALTH CARE EDUCATION/TRAINING PROGRAM

## 2025-04-24 ASSESSMENT — INTRAOCULAR PRESSURE
OD: 13
OS: 13

## 2025-05-22 ENCOUNTER — POST-OPERATIVE VISIT (OUTPATIENT)
Dept: URBAN - METROPOLITAN AREA CLINIC 7 | Facility: CLINIC | Age: 85
End: 2025-05-22
Payer: MEDICARE

## 2025-05-22 DIAGNOSIS — H59.029 LENS FRAGMENT IN EYE FOLLOWING CATARACT SURGERY: Primary | ICD-10-CM

## 2025-05-22 PROCEDURE — 92134 CPTRZ OPH DX IMG PST SGM RTA: CPT | Performed by: STUDENT IN AN ORGANIZED HEALTH CARE EDUCATION/TRAINING PROGRAM

## 2025-05-22 PROCEDURE — 99024 POSTOP FOLLOW-UP VISIT: CPT | Performed by: STUDENT IN AN ORGANIZED HEALTH CARE EDUCATION/TRAINING PROGRAM

## 2025-05-22 ASSESSMENT — INTRAOCULAR PRESSURE
OS: 10
OD: 6

## 2025-08-20 ENCOUNTER — OFFICE VISIT (OUTPATIENT)
Dept: URBAN - METROPOLITAN AREA CLINIC 7 | Facility: CLINIC | Age: 85
End: 2025-08-20
Payer: MEDICARE

## 2025-08-20 DIAGNOSIS — H25.12 AGE-RELATED NUCLEAR CATARACT, LEFT EYE: ICD-10-CM

## 2025-08-20 DIAGNOSIS — H59.029 LENS FRAGMENT IN EYE FOLLOWING CATARACT SURGERY: Primary | ICD-10-CM

## 2025-08-20 DIAGNOSIS — H04.123 DRY EYE SYNDROME OF BILATERAL LACRIMAL GLANDS: ICD-10-CM

## 2025-08-20 DIAGNOSIS — Z96.1 PRESENCE OF INTRAOCULAR LENS: ICD-10-CM

## 2025-08-20 DIAGNOSIS — H35.3211 EXUDATIVE AGE-RELATED MACULAR DEGENERATION, RIGHT EYE, WITH ACTIVE CHOROIDAL NEOVASCULARIZATION: ICD-10-CM

## 2025-08-20 DIAGNOSIS — H35.81 RETINAL EDEMA: ICD-10-CM

## 2025-08-20 PROCEDURE — 92134 CPTRZ OPH DX IMG PST SGM RTA: CPT | Performed by: STUDENT IN AN ORGANIZED HEALTH CARE EDUCATION/TRAINING PROGRAM

## 2025-08-20 PROCEDURE — 67028 INJECTION EYE DRUG: CPT | Performed by: STUDENT IN AN ORGANIZED HEALTH CARE EDUCATION/TRAINING PROGRAM

## 2025-08-20 PROCEDURE — 99214 OFFICE O/P EST MOD 30 MIN: CPT | Performed by: STUDENT IN AN ORGANIZED HEALTH CARE EDUCATION/TRAINING PROGRAM

## 2025-08-20 ASSESSMENT — INTRAOCULAR PRESSURE
OD: 12
OS: 14